# Patient Record
Sex: FEMALE | URBAN - METROPOLITAN AREA
[De-identification: names, ages, dates, MRNs, and addresses within clinical notes are randomized per-mention and may not be internally consistent; named-entity substitution may affect disease eponyms.]

---

## 2019-07-03 VITALS
HEIGHT: 65 IN | TEMPERATURE: 98 F | SYSTOLIC BLOOD PRESSURE: 106 MMHG | RESPIRATION RATE: 16 BRPM | WEIGHT: 119.93 LBS | DIASTOLIC BLOOD PRESSURE: 59 MMHG | OXYGEN SATURATION: 99 % | HEART RATE: 81 BPM

## 2019-07-03 NOTE — PRE-OP CHECKLIST - NS PREOP CHK CHLOROHEX WASH
Problem: Communication  Goal: The ability to communicate needs accurately and effectively will improve    Intervention: Reorient patient to environment as needed  Patient and family oriented to room and to call for assistance before attempting to get out of bed.      Problem: Knowledge Deficit  Goal: Knowledge of disease process/condition, treatment plan, diagnostic tests, and medications will improve    Intervention: Assess knowledge level of disease process/condition, treatment plan, diagnostic tests, and medications  Reviewed plan of care with family and patient,  Including current medications         N/A

## 2019-07-03 NOTE — ASU PATIENT PROFILE, ADULT - PSH
Surgery, elective  biopsy of fibroid Elective surgery  right ovarian cyst removal, 1991  History of appendectomy  1992  Surgery, elective  biopsy of fibroid, June 17, 2019

## 2019-07-05 ENCOUNTER — OUTPATIENT (OUTPATIENT)
Dept: OUTPATIENT SERVICES | Facility: HOSPITAL | Age: 44
LOS: 1 days | Discharge: HOME | End: 2019-07-05
Payer: COMMERCIAL

## 2019-07-05 DIAGNOSIS — Z41.9 ENCOUNTER FOR PROCEDURE FOR PURPOSES OTHER THAN REMEDYING HEALTH STATE, UNSPECIFIED: Chronic | ICD-10-CM

## 2019-07-05 DIAGNOSIS — Z90.49 ACQUIRED ABSENCE OF OTHER SPECIFIED PARTS OF DIGESTIVE TRACT: Chronic | ICD-10-CM

## 2019-07-05 LAB
BASOPHILS # BLD AUTO: 0 K/UL — SIGNIFICANT CHANGE UP (ref 0–0.2)
BASOPHILS NFR BLD AUTO: 0 % — SIGNIFICANT CHANGE UP (ref 0–2)
EOSINOPHIL # BLD AUTO: 0 K/UL — SIGNIFICANT CHANGE UP (ref 0–0.5)
EOSINOPHIL NFR BLD AUTO: 0 % — SIGNIFICANT CHANGE UP (ref 0–6)
HCT VFR BLD CALC: 40 % — SIGNIFICANT CHANGE UP (ref 34.5–45)
HGB BLD-MCNC: 13 G/DL — SIGNIFICANT CHANGE UP (ref 11.5–15.5)
LYMPHOCYTES # BLD AUTO: 0.71 K/UL — LOW (ref 1–3.3)
LYMPHOCYTES # BLD AUTO: 4.4 % — LOW (ref 13–44)
MCHC RBC-ENTMCNC: 32.5 GM/DL — SIGNIFICANT CHANGE UP (ref 32–36)
MCHC RBC-ENTMCNC: 33.7 PG — SIGNIFICANT CHANGE UP (ref 27–34)
MCV RBC AUTO: 103.6 FL — HIGH (ref 80–100)
MONOCYTES # BLD AUTO: 0 K/UL — SIGNIFICANT CHANGE UP (ref 0–0.9)
MONOCYTES NFR BLD AUTO: 0 % — LOW (ref 2–14)
NEUTROPHILS # BLD AUTO: 15.33 K/UL — HIGH (ref 1.8–7.4)
NEUTROPHILS NFR BLD AUTO: 80.7 % — HIGH (ref 43–77)
PLATELET # BLD AUTO: 198 K/UL — SIGNIFICANT CHANGE UP (ref 150–400)
RBC # BLD: 3.86 M/UL — SIGNIFICANT CHANGE UP (ref 3.8–5.2)
RBC # FLD: 13.9 % — SIGNIFICANT CHANGE UP (ref 10.3–14.5)
WBC # BLD: 16.04 K/UL — HIGH (ref 3.8–10.5)
WBC # FLD AUTO: 16.04 K/UL — HIGH (ref 3.8–10.5)

## 2019-07-05 RX ORDER — SODIUM CHLORIDE 9 MG/ML
1000 INJECTION, SOLUTION INTRAVENOUS
Refills: 0 | Status: DISCONTINUED | OUTPATIENT
Start: 2019-07-05 | End: 2019-07-06

## 2019-07-05 RX ORDER — ONDANSETRON 8 MG/1
4 TABLET, FILM COATED ORAL ONCE
Refills: 0 | Status: DISCONTINUED | OUTPATIENT
Start: 2019-07-05 | End: 2019-07-06

## 2019-07-05 RX ORDER — SIMETHICONE 80 MG/1
80 TABLET, CHEWABLE ORAL THREE TIMES A DAY
Refills: 0 | Status: DISCONTINUED | OUTPATIENT
Start: 2019-07-05 | End: 2019-07-06

## 2019-07-05 RX ORDER — OXYCODONE HYDROCHLORIDE 5 MG/1
5 TABLET ORAL EVERY 4 HOURS
Refills: 0 | Status: DISCONTINUED | OUTPATIENT
Start: 2019-07-05 | End: 2019-07-06

## 2019-07-05 RX ORDER — OXYCODONE HYDROCHLORIDE 5 MG/1
10 TABLET ORAL EVERY 6 HOURS
Refills: 0 | Status: DISCONTINUED | OUTPATIENT
Start: 2019-07-05 | End: 2019-07-06

## 2019-07-05 RX ORDER — ACETAMINOPHEN 500 MG
650 TABLET ORAL EVERY 6 HOURS
Refills: 0 | Status: DISCONTINUED | OUTPATIENT
Start: 2019-07-05 | End: 2019-07-06

## 2019-07-05 RX ORDER — KETOROLAC TROMETHAMINE 30 MG/ML
30 SYRINGE (ML) INJECTION EVERY 6 HOURS
Refills: 0 | Status: DISCONTINUED | OUTPATIENT
Start: 2019-07-05 | End: 2019-07-06

## 2019-07-05 RX ORDER — METOCLOPRAMIDE HCL 10 MG
10 TABLET ORAL ONCE
Refills: 0 | Status: DISCONTINUED | OUTPATIENT
Start: 2019-07-05 | End: 2019-07-06

## 2019-07-05 RX ORDER — KETOROLAC TROMETHAMINE 30 MG/ML
30 SYRINGE (ML) INJECTION ONCE
Refills: 0 | Status: DISCONTINUED | OUTPATIENT
Start: 2019-07-05 | End: 2019-07-05

## 2019-07-05 RX ORDER — HYDROMORPHONE HYDROCHLORIDE 2 MG/ML
0.5 INJECTION INTRAMUSCULAR; INTRAVENOUS; SUBCUTANEOUS
Refills: 0 | Status: DISCONTINUED | OUTPATIENT
Start: 2019-07-05 | End: 2019-07-05

## 2019-07-05 RX ORDER — OXYCODONE HYDROCHLORIDE 5 MG/1
5 TABLET ORAL EVERY 6 HOURS
Refills: 0 | Status: DISCONTINUED | OUTPATIENT
Start: 2019-07-05 | End: 2019-07-05

## 2019-07-05 RX ADMIN — Medication 650 MILLIGRAM(S): at 23:18

## 2019-07-05 RX ADMIN — Medication 30 MILLIGRAM(S): at 22:18

## 2019-07-05 RX ADMIN — HYDROMORPHONE HYDROCHLORIDE 0.5 MILLIGRAM(S): 2 INJECTION INTRAMUSCULAR; INTRAVENOUS; SUBCUTANEOUS at 21:24

## 2019-07-05 RX ADMIN — HYDROMORPHONE HYDROCHLORIDE 0.5 MILLIGRAM(S): 2 INJECTION INTRAMUSCULAR; INTRAVENOUS; SUBCUTANEOUS at 21:18

## 2019-07-05 RX ADMIN — HYDROMORPHONE HYDROCHLORIDE 0.5 MILLIGRAM(S): 2 INJECTION INTRAMUSCULAR; INTRAVENOUS; SUBCUTANEOUS at 20:02

## 2019-07-05 RX ADMIN — Medication 30 MILLIGRAM(S): at 23:38

## 2019-07-05 RX ADMIN — OXYCODONE HYDROCHLORIDE 10 MILLIGRAM(S): 5 TABLET ORAL at 23:18

## 2019-07-05 RX ADMIN — HYDROMORPHONE HYDROCHLORIDE 0.5 MILLIGRAM(S): 2 INJECTION INTRAMUSCULAR; INTRAVENOUS; SUBCUTANEOUS at 20:15

## 2019-07-05 NOTE — BRIEF OPERATIVE NOTE - NSICDXBRIEFPREOP_GEN_ALL_CORE_FT
PRE-OP DIAGNOSIS:  Endometriosis 05-Jul-2019 18:48:36  Aly Gooden  Fibroid uterus 05-Jul-2019 18:48:29  Aly Gooden

## 2019-07-05 NOTE — BRIEF OPERATIVE NOTE - NSICDXBRIEFPROCEDURE_GEN_ALL_CORE_FT
PROCEDURES:  Robot-assisted laparoscopic excision of endometriosis 05-Jul-2019 18:48:14  Aly Gooden  Cystoscopy, female 05-Jul-2019 18:48:03  Aly Gooden  Robot-assisted laparoscopic myomectomy of 1 to 4 myomas 05-Jul-2019 18:47:49  Aly Gooden

## 2019-07-05 NOTE — BRIEF OPERATIVE NOTE - OPERATION/FINDINGS
Robotic-assisted L/S myomectomy of a 12 cm fibroid in the anterior uterine wall and a second 4 cm fibroid Rt. fundal wall., removed through a mini-lap incision at the umbilicus and was cut in a bag.  Excision of peritoneum with endometriotic lesions.  Bilateral Hydrosalpinx were seen.  Cystoscopy - normal intact bladder, bilateral ureters jet were seen.

## 2019-07-06 VITALS
DIASTOLIC BLOOD PRESSURE: 59 MMHG | HEART RATE: 66 BPM | OXYGEN SATURATION: 97 % | TEMPERATURE: 98 F | SYSTOLIC BLOOD PRESSURE: 93 MMHG | RESPIRATION RATE: 17 BRPM

## 2019-07-06 DIAGNOSIS — D25.9 LEIOMYOMA OF UTERUS, UNSPECIFIED: ICD-10-CM

## 2019-07-06 LAB
ANION GAP SERPL CALC-SCNC: 11 MMOL/L — SIGNIFICANT CHANGE UP (ref 5–17)
BUN SERPL-MCNC: 7 MG/DL — SIGNIFICANT CHANGE UP (ref 7–23)
CALCIUM SERPL-MCNC: 8.1 MG/DL — LOW (ref 8.4–10.5)
CHLORIDE SERPL-SCNC: 102 MMOL/L — SIGNIFICANT CHANGE UP (ref 96–108)
CO2 SERPL-SCNC: 23 MMOL/L — SIGNIFICANT CHANGE UP (ref 22–31)
CREAT SERPL-MCNC: 0.55 MG/DL — SIGNIFICANT CHANGE UP (ref 0.5–1.3)
GLUCOSE SERPL-MCNC: 94 MG/DL — SIGNIFICANT CHANGE UP (ref 70–99)
HCT VFR BLD CALC: 35.2 % — SIGNIFICANT CHANGE UP (ref 34.5–45)
HCT VFR BLD CALC: 36.1 % — SIGNIFICANT CHANGE UP (ref 34.5–45)
HGB BLD-MCNC: 11.1 G/DL — LOW (ref 11.5–15.5)
HGB BLD-MCNC: 11.3 G/DL — LOW (ref 11.5–15.5)
MCHC RBC-ENTMCNC: 31.3 GM/DL — LOW (ref 32–36)
MCHC RBC-ENTMCNC: 31.5 GM/DL — LOW (ref 32–36)
MCHC RBC-ENTMCNC: 32.7 PG — SIGNIFICANT CHANGE UP (ref 27–34)
MCHC RBC-ENTMCNC: 33.2 PG — SIGNIFICANT CHANGE UP (ref 27–34)
MCV RBC AUTO: 104.3 FL — HIGH (ref 80–100)
MCV RBC AUTO: 105.4 FL — HIGH (ref 80–100)
NRBC # BLD: 0 /100 WBCS — SIGNIFICANT CHANGE UP (ref 0–0)
NRBC # BLD: 0 /100 WBCS — SIGNIFICANT CHANGE UP (ref 0–0)
PLATELET # BLD AUTO: 179 K/UL — SIGNIFICANT CHANGE UP (ref 150–400)
PLATELET # BLD AUTO: 189 K/UL — SIGNIFICANT CHANGE UP (ref 150–400)
POTASSIUM SERPL-MCNC: 4.1 MMOL/L — SIGNIFICANT CHANGE UP (ref 3.5–5.3)
POTASSIUM SERPL-SCNC: 4.1 MMOL/L — SIGNIFICANT CHANGE UP (ref 3.5–5.3)
RBC # BLD: 3.34 M/UL — LOW (ref 3.8–5.2)
RBC # BLD: 3.46 M/UL — LOW (ref 3.8–5.2)
RBC # FLD: 14 % — SIGNIFICANT CHANGE UP (ref 10.3–14.5)
RBC # FLD: 14.1 % — SIGNIFICANT CHANGE UP (ref 10.3–14.5)
SODIUM SERPL-SCNC: 136 MMOL/L — SIGNIFICANT CHANGE UP (ref 135–145)
WBC # BLD: 10.83 K/UL — HIGH (ref 3.8–10.5)
WBC # BLD: 12.16 K/UL — HIGH (ref 3.8–10.5)
WBC # FLD AUTO: 10.83 K/UL — HIGH (ref 3.8–10.5)
WBC # FLD AUTO: 12.16 K/UL — HIGH (ref 3.8–10.5)

## 2019-07-06 RX ORDER — HYDROMORPHONE HYDROCHLORIDE 2 MG/ML
0.5 INJECTION INTRAMUSCULAR; INTRAVENOUS; SUBCUTANEOUS EVERY 4 HOURS
Refills: 0 | Status: DISCONTINUED | OUTPATIENT
Start: 2019-07-06 | End: 2019-07-06

## 2019-07-06 RX ORDER — SODIUM CHLORIDE 9 MG/ML
3 INJECTION INTRAMUSCULAR; INTRAVENOUS; SUBCUTANEOUS EVERY 8 HOURS
Refills: 0 | Status: DISCONTINUED | OUTPATIENT
Start: 2019-07-06 | End: 2019-07-06

## 2019-07-06 RX ORDER — DIPHENHYDRAMINE HCL 50 MG
50 CAPSULE ORAL ONCE
Refills: 0 | Status: COMPLETED | OUTPATIENT
Start: 2019-07-06 | End: 2019-07-06

## 2019-07-06 RX ORDER — OXYCODONE HYDROCHLORIDE 5 MG/1
5 TABLET ORAL EVERY 4 HOURS
Refills: 0 | Status: DISCONTINUED | OUTPATIENT
Start: 2019-07-06 | End: 2019-07-06

## 2019-07-06 RX ORDER — OXYCODONE HYDROCHLORIDE 5 MG/1
10 TABLET ORAL EVERY 6 HOURS
Refills: 0 | Status: DISCONTINUED | OUTPATIENT
Start: 2019-07-06 | End: 2019-07-06

## 2019-07-06 RX ADMIN — HYDROMORPHONE HYDROCHLORIDE 0.5 MILLIGRAM(S): 2 INJECTION INTRAMUSCULAR; INTRAVENOUS; SUBCUTANEOUS at 05:10

## 2019-07-06 RX ADMIN — Medication 30 MILLIGRAM(S): at 16:28

## 2019-07-06 RX ADMIN — SODIUM CHLORIDE 3 MILLILITER(S): 9 INJECTION INTRAMUSCULAR; INTRAVENOUS; SUBCUTANEOUS at 13:11

## 2019-07-06 RX ADMIN — Medication 650 MILLIGRAM(S): at 00:05

## 2019-07-06 RX ADMIN — Medication 650 MILLIGRAM(S): at 10:22

## 2019-07-06 RX ADMIN — Medication 650 MILLIGRAM(S): at 11:24

## 2019-07-06 RX ADMIN — Medication 30 MILLIGRAM(S): at 10:17

## 2019-07-06 RX ADMIN — OXYCODONE HYDROCHLORIDE 10 MILLIGRAM(S): 5 TABLET ORAL at 00:12

## 2019-07-06 RX ADMIN — Medication 30 MILLIGRAM(S): at 15:37

## 2019-07-06 RX ADMIN — Medication 50 MILLIGRAM(S): at 01:45

## 2019-07-06 RX ADMIN — OXYCODONE HYDROCHLORIDE 10 MILLIGRAM(S): 5 TABLET ORAL at 13:11

## 2019-07-06 RX ADMIN — Medication 30 MILLIGRAM(S): at 04:39

## 2019-07-06 RX ADMIN — OXYCODONE HYDROCHLORIDE 10 MILLIGRAM(S): 5 TABLET ORAL at 12:06

## 2019-07-06 RX ADMIN — Medication 650 MILLIGRAM(S): at 05:08

## 2019-07-06 RX ADMIN — Medication 30 MILLIGRAM(S): at 09:17

## 2019-07-06 RX ADMIN — Medication 30 MILLIGRAM(S): at 04:11

## 2019-07-06 NOTE — DISCHARGE NOTE NURSING/CASE MANAGEMENT/SOCIAL WORK - NSDCPNINST_GEN_ALL_CORE
Call Dr. Perez if you have any questions or concerns. Educational material given - [employer] Online Patient Education: Surgical Wound Discharge Instructions

## 2019-07-06 NOTE — PROGRESS NOTE ADULT - ASSESSMENT
43y Female POD#1 s/p RA l/s myomectomy c/b urinary retention                        Repeat PostOp HB - 11.1 (stable)    1. Neuro/Pain:  OPM  2  CV:  VS per routine  3. Pulm: Encourage ISS & Ambulation  4. GI:  regular diet   5. :  voiding  6. DVT ppx: SCDs, ambulation  7. Dispo: POD #1    Plan to D/C home.  D/w Dr. Perez

## 2019-07-06 NOTE — PROGRESS NOTE ADULT - ASSESSMENT
43y Female POD#1 s/p RA l/s myomectomy c/b urinary retention                          1. Neuro/Pain:  OPM  2  CV:  VS per routine  3. Pulm: Encourage ISS & Ambulation  4. GI:  regular diet   5. : Hamilton removed this AM, f/u TOV  6. DVT ppx: SCDs, ambulation  7. Dispo: POD #1

## 2019-07-06 NOTE — DISCHARGE NOTE PROVIDER - NSDCFUADDINST_GEN_ALL_CORE_FT
- Nothing in vagina - no intercourse, tampons, or douching until cleared by your doctor.   - Avoid swimming, tub baths, and heavy lifting until cleared by your doctor.   - Showering is ok.   - Continue oral pain medications as needed for pain.    - Follow up in office in 1 week for your postoperative visit.    - Call the office sooner if you develop any fever, heavy bleeding, or severe pain.  Go to the closest emergency room for any of these symptoms if you are not able to contact your doctor.

## 2019-07-06 NOTE — PROGRESS NOTE ADULT - SUBJECTIVE AND OBJECTIVE BOX
Pt evaluated at bedside. No acute overnight events. Joy just removed this AM, not OOB yet.   She reports pain is well controlled with pain meds, toradol, oxycodone, tylenol.   She denies HA, dizziness, SOB, CP, palpitations, n/v.    T(C): 37.1 (07-06-19 @ 04:55), Max: 37.1 (07-06-19 @ 04:55)  HR: 55 (07-06-19 @ 04:55) (55 - 74)  BP: 106/71 (07-06-19 @ 05:12) (91/55 - 106/71)  RR: 17 (07-06-19 @ 04:55) (17 - 23)  SpO2: 97% (07-06-19 @ 04:55) (94% - 99%)  GA: NAD   CV: RRR   Pulm: CTAB  Abd: +BS, soft, NTND, no rebound or guarding, well healing l/s port incisons   Extrem: SCDs in place    07-05 @ 07:01  -  07-06 @ 07:00  --------------------------------------------------------  IN: 875 mL / OUT: 1550 mL / NET: -675 mL                              11.3   12.16 )-----------( 189      ( 06 Jul 2019 07:25 )             36.1     07-06    136  |  102  |  7   ----------------------------<  94  4.1   |  23  |  0.55    Ca    8.1<L>      06 Jul 2019 07:25

## 2019-07-06 NOTE — DISCHARGE NOTE PROVIDER - HOSPITAL COURSE
s/p RA l/s myomectomy c/b urinary retention, resolved after overnight observation with Hamilton.    Patient has passed her post operative milestones, with normal postop HB(11.1).    Will be discharged home.

## 2019-07-06 NOTE — PROGRESS NOTE ADULT - SUBJECTIVE AND OBJECTIVE BOX
patient seen at bedside for pain. Says she is unsure if the pain medications are help. Has generalized abdominal pain since the surgery. denies nausea/vomiting. PE General appearance is well. abdomen soft, mildly tender throughout, well healing laparoscopic incisions, non distended, +BS, no guarding. UOP adequate. VS wnl. Patient would like to sleep. Plan for dilaudid for breakthrough pain and benadryl.     Vital Signs Last 24 Hrs  T(C): 36.7 (05 Jul 2019 23:17), Max: 36.7 (05 Jul 2019 23:17)  T(F): 98.1 (05 Jul 2019 23:17), Max: 98.1 (05 Jul 2019 23:17)  HR: 72 (05 Jul 2019 23:17) (60 - 96)  BP: 103/64 (05 Jul 2019 23:17) (80/45 - 103/69)  BP(mean): 68 (05 Jul 2019 22:25) (56 - 85)  RR: 17 (05 Jul 2019 23:17) (10 - 23)  SpO2: 99% (05 Jul 2019 23:17) (94% - 100%)

## 2019-07-06 NOTE — PROGRESS NOTE ADULT - SUBJECTIVE AND OBJECTIVE BOX
GYN Progress Note    Patient seen at bedside.  Pain controlled on Tylenol and Motrin  Tolerating regular diet  OOB and Voiding spontaneously.  +flatus  Denies CP, palpitations, SOB, fever, chills, nausea, vomiting.    Vital Signs Last 24 Hrs  T(C): 36.8 (06 Jul 2019 16:05), Max: 37.1 (06 Jul 2019 04:55)  T(F): 98.3 (06 Jul 2019 16:05), Max: 98.7 (06 Jul 2019 04:55)  HR: 66 (06 Jul 2019 16:05) (55 - 96)  BP: 93/59 (06 Jul 2019 16:05) (80/45 - 106/71)  BP(mean): 68 (05 Jul 2019 22:25) (56 - 85)  RR: 17 (06 Jul 2019 16:05) (10 - 23)  SpO2: 97% (06 Jul 2019 16:05) (94% - 100%)    Physical Exam:  Gen: No Acute Distress  Pulm: Clear to auscultation bilaterally  GI: soft, appropriately nontender, nondistended, +BS, no rebound, no guarding.  Incision C/D/I  Ext: SCDs in place, [facility]    I&O's Summary    05 Jul 2019 07:01  -  06 Jul 2019 07:00  --------------------------------------------------------  IN: 875 mL / OUT: 1550 mL / NET: -675 mL    06 Jul 2019 07:01  -  06 Jul 2019 16:51  --------------------------------------------------------  IN: 480 mL / OUT: 730 mL / NET: -250 mL      MEDICATIONS  (STANDING):  acetaminophen   Tablet .. 650 milliGRAM(s) Oral every 6 hours  ketorolac   Injectable 30 milliGRAM(s) IV Push every 6 hours  sodium chloride 0.9% lock flush 3 milliLiter(s) IV Push every 8 hours    MEDICATIONS  (PRN):  HYDROmorphone  Injectable 0.5 milliGRAM(s) IV Push every 4 hours PRN Severe Pain (7 - 10)  metoclopramide Injectable 10 milliGRAM(s) IV Push Once PRN Nausea and/or Vomiting  ondansetron Injectable 4 milliGRAM(s) IV Push Once PRN Postoperative Nausea and/or Vomiting  oxyCODONE    IR 5 milliGRAM(s) Oral every 4 hours PRN Mild Pain (1 - 3)  oxyCODONE    IR 10 milliGRAM(s) Oral every 6 hours PRN Moderate Pain (4 - 6)  simethicone 80 milliGRAM(s) Chew three times a day PRN Gas      LABS:                        11.1   10.83 )-----------( 179      ( 06 Jul 2019 15:41 )             35.2     07-06    136  |  102  |  7   ----------------------------<  94  4.1   |  23  |  0.55    Ca    8.1<L>      06 Jul 2019 07:25

## 2019-07-06 NOTE — DISCHARGE NOTE PROVIDER - NSDCCPTREATMENT_GEN_ALL_CORE_FT
PRINCIPAL PROCEDURE  Procedure: Robot-assisted myotomy using da Aldair Xi  Findings and Treatment:       SECONDARY PROCEDURE  Procedure: Robot-assisted laparoscopic excision of endometriosis  Findings and Treatment:     Procedure: Cystoscopy, adult  Findings and Treatment:

## 2019-07-06 NOTE — DISCHARGE NOTE NURSING/CASE MANAGEMENT/SOCIAL WORK - NSDCDPATPORTLINK_GEN_ALL_CORE
You can access the [facility], offered by [facility], by registering with the following website: [email or web address]

## 2019-07-06 NOTE — DISCHARGE NOTE PROVIDER - CARE PROVIDER_API CALL
Babar Perez)  Obstetrics and Gynecology  [address] [place] [address], [address]  [place] [address]  Phone: [phone]  Fax: [phone]  Follow Up Time:

## 2019-07-09 LAB — SURGICAL PATHOLOGY STUDY: SIGNIFICANT CHANGE UP

## 2019-07-09 PROCEDURE — C1889: CPT

## 2019-07-09 PROCEDURE — 86900 BLOOD TYPING SEROLOGIC ABO: CPT

## 2019-07-09 PROCEDURE — 85025 COMPLETE CBC W/AUTO DIFF WBC: CPT

## 2019-07-09 PROCEDURE — 86850 RBC ANTIBODY SCREEN: CPT

## 2019-07-09 PROCEDURE — 85027 COMPLETE CBC AUTOMATED: CPT

## 2019-07-09 PROCEDURE — 58662 LAPAROSCOPY EXCISE LESIONS: CPT

## 2019-07-09 PROCEDURE — 88305 TISSUE EXAM BY PATHOLOGIST: CPT

## 2019-07-09 PROCEDURE — C9399: CPT

## 2019-07-09 PROCEDURE — 86901 BLOOD TYPING SEROLOGIC RH(D): CPT

## 2019-07-09 PROCEDURE — S2900: CPT

## 2019-07-09 PROCEDURE — 80048 BASIC METABOLIC PNL TOTAL CA: CPT

## 2019-07-09 PROCEDURE — 58545 LAPAROSCOPIC MYOMECTOMY: CPT

## 2019-07-09 PROCEDURE — 36415 COLL VENOUS BLD VENIPUNCTURE: CPT

## 2020-07-31 NOTE — ASU PATIENT PROFILE, ADULT - PAIN SCALE PREFERRED, PROFILE
Patient name: Paolo   Person calling: Robert ([employer])  Phone Number: [phone]  Best Availability: Any   Fax Number: [phone]  Facility name: [employer]   Who person saw last: KATYA Navarrete     Detailed reason for call:  Robert from [employer] called in adding that they would like most recent office notes from patient, this is for patient to be able to qualify for [employer], caller provided with fax number that this can be faxed to.    tolerable pain related to fibroid  biopsy

## 2023-12-28 NOTE — BRIEF OPERATIVE NOTE - NSICDXBRIEFPOSTOP_GEN_ALL_CORE_FT
POST-OP DIAGNOSIS:  Hydrosalpinx 05-Jul-2019 18:49:13 Bilateral Aly Gooden  Endometriosis 05-Jul-2019 18:49:02  Aly Gooden
Ambulette